# Patient Record
Sex: MALE | Race: WHITE | Employment: FULL TIME | URBAN - METROPOLITAN AREA
[De-identification: names, ages, dates, MRNs, and addresses within clinical notes are randomized per-mention and may not be internally consistent; named-entity substitution may affect disease eponyms.]

---

## 2022-09-26 ENCOUNTER — TELEPHONE (OUTPATIENT)
Dept: ORTHOPEDIC SURGERY | Age: 52
End: 2022-09-26

## 2022-10-03 ENCOUNTER — OFFICE VISIT (OUTPATIENT)
Dept: ORTHOPEDIC SURGERY | Age: 52
End: 2022-10-03
Payer: COMMERCIAL

## 2022-10-03 VITALS — HEIGHT: 73 IN | BODY MASS INDEX: 30.93 KG/M2 | WEIGHT: 233.4 LBS

## 2022-10-03 DIAGNOSIS — M54.16 LUMBAR RADICULOPATHY: ICD-10-CM

## 2022-10-03 DIAGNOSIS — M54.50 LOW BACK PAIN, UNSPECIFIED BACK PAIN LATERALITY, UNSPECIFIED CHRONICITY, UNSPECIFIED WHETHER SCIATICA PRESENT: Primary | ICD-10-CM

## 2022-10-03 DIAGNOSIS — M48.061 STENOSIS OF LATERAL RECESS OF LUMBAR SPINE: ICD-10-CM

## 2022-10-03 DIAGNOSIS — M51.36 LUMBAR DEGENERATIVE DISC DISEASE: ICD-10-CM

## 2022-10-03 PROCEDURE — 99204 OFFICE O/P NEW MOD 45 MIN: CPT | Performed by: NURSE PRACTITIONER

## 2022-10-03 RX ORDER — CELECOXIB 200 MG/1
200 CAPSULE ORAL DAILY PRN
Qty: 30 CAPSULE | Refills: 0 | Status: SHIPPED | OUTPATIENT
Start: 2022-10-03

## 2022-10-03 RX ORDER — GABAPENTIN 300 MG/1
300 CAPSULE ORAL NIGHTLY
Qty: 30 CAPSULE | Refills: 0 | Status: SHIPPED | OUTPATIENT
Start: 2022-10-03 | End: 2022-11-02

## 2022-10-03 RX ORDER — LISINOPRIL 5 MG/1
TABLET ORAL
COMMUNITY
Start: 2022-07-27

## 2022-10-03 RX ORDER — OMEGA-3 FATTY ACIDS/FISH OIL 300-1000MG
200 CAPSULE ORAL 2 TIMES DAILY
COMMUNITY
Start: 2014-09-23

## 2022-10-03 RX ORDER — NAPROXEN 500 MG/1
TABLET ORAL
COMMUNITY

## 2022-10-03 RX ORDER — ESOMEPRAZOLE MAGNESIUM 10 MG/1
20 GRANULE, FOR SUSPENSION, EXTENDED RELEASE ORAL DAILY
COMMUNITY

## 2022-10-03 RX ORDER — ROSUVASTATIN CALCIUM 20 MG/1
20 TABLET, COATED ORAL
COMMUNITY
Start: 2022-06-20

## 2022-10-03 RX ORDER — DICLOFENAC SODIUM 20 MG/G
2 SOLUTION TOPICAL 2 TIMES DAILY
COMMUNITY
Start: 2021-04-21

## 2022-10-03 RX ORDER — METHYLPREDNISOLONE 4 MG/1
TABLET ORAL
Qty: 1 KIT | Refills: 0 | Status: SHIPPED | OUTPATIENT
Start: 2022-10-03

## 2022-10-03 NOTE — PROGRESS NOTES
Name: Kymberly Goldsmith. YOB: 1970  Gender: male  MRN: [id number]    CC: Chronic left leg pain    HPI: This is a 47y.o. year old male who resents with chronic left leg pain. He has had acute on chronic pain for some time however recently has been getting worse. Its in the left buttock and can radiate down to the posterior mid thigh. He also has intermittent left bursitis symptoms. Patient had L5-S1 discectomy on the left by Dr. Bashir Hernandez 10 years ago. He states that the pain used to go all the way to his foot but after surgery stopped at his mid thigh. He did have a repeat MRI after surgery which showed very mild disc in 2013 abutting the S1 nerve root on the left. The patient denies any change in bowel or bladder function since the onset of the symptoms. Thus far, the patient has tried NSAIDS, oral steroids, gabapentin or lyrica, and physician directed home exercise program    Current pain level: Activities limited by pain:        AMB PAIN ASSESSMENT 10/3/2022   Location of Pain Back   Location Modifiers Left   Frequency of Pain Intermittent   Limiting Behavior Yes   Result of Injury No   Work-Related Injury No            ROS/Meds/PSH/PMH/FH/SH: I personally reviewed the patient's collected intake data. Below are the pertinents:    No Known Allergies      Current Outpatient Medications:     rosuvastatin (CRESTOR) 20 MG tablet, Take 20 mg by mouth, Disp: , Rfl:     ibuprofen (ADVIL;MOTRIN) 200 MG CAPS, 200 mg 2 times daily, Disp: , Rfl:     Diclofenac Sodium (PENNSAID) 2 % SOLN, Apply 2 Pump topically 2 times daily, Disp: , Rfl:     methylPREDNISolone (MEDROL DOSEPACK) 4 MG tablet, Take by mouth as directed. Start in morning, Disp: 1 kit, Rfl: 0    gabapentin (NEURONTIN) 300 MG capsule, Take 1 capsule by mouth nightly for 30 days.  Intended supply: 30 days, Disp: 30 capsule, Rfl: 0    celecoxib (CELEBREX) 200 MG capsule, Take 1 capsule by mouth daily as needed for Pain, Disp: 30 capsule, Rfl: 0    lisinopril (PRINIVIL;ZESTRIL) 5 MG tablet, TAKE 1 TABLET BY MOUTH EVERY DAY, Disp: , Rfl:     naproxen (NAPROSYN) 500 MG tablet, naproxen 500 mg tablet  Take 1 tablet twice a day by oral route., Disp: , Rfl:     Esomeprazole Magnesium 10 MG PACK, Take 20 mg by mouth daily, Disp: , Rfl:     History reviewed. No pertinent surgical history. There is no problem list on file for this patient. Tobacco:  reports that he has never smoked. He has never used smokeless tobacco.  Alcohol:   Social History     Substance and Sexual Activity   Alcohol Use Yes          Physical Exam:   BMI: Body mass index is 31.22 kg/m². GENERAL:  Adult in no acute distress, well developed, well nourished Patient is appropriately conversant  MSK:  Examination of the lumbar spine reveals paraspinal tenderness , facet tenderness   There is moderate tenderness to palpation along the spinous processes and paraspinal musculature. The patient ambulates with a normal gait. ROM of bilateral hip(s) reveals no irritability. NEURO:  Cranial nerves grossly intact. No motor deficits. Straight leg testing is positive left  Sensory testing reveals intact sensation to light touch and in the distribution of the L3-S1 dermatomes bilaterally  Ankle jerk is negative for clonus    Reflexes   Right Left   Quadriceps (L4) 2 2   Achilles (S1) 2 2     Strength testing in the lower extremity reveals the following based on the 5 point grading scale:     HF (L2) H Ab (L5) KE (L3/4) ADF (L4) EHL (L5) A Ev (S1) APF (S1)   Right 5 5 5 5 5 5 5   Left 5 5 5 5 5 5 5     PSYCH:  Alert and oriented X 3. Appropriate affect. Intact judgment and insight. Radiographic Studies:     AP, lateral and spot views of the lumbar spine:      Patient has severe disc degeneration at L5-S1. Diffuse spondylotic changes throughout. No fractures or lesions. Lordosis is still maintained.     Interpretation: Degenerative disc disease at L5-S1 that is severe nature. MRI of Lumbar spine images independently reviewed:   Assistant lateral recess stenosis and disc at L5-S1 on the left abutting the left S1 nerve root from an MRI in 2013      Assessment/Plan:       Diagnosis Orders   1. Low back pain, unspecified back pain laterality, unspecified chronicity, unspecified whether sciatica present  XR LUMBAR SPINE (2-3 VIEWS)      2. Lumbar radiculopathy  MRI LUMBAR SPINE WO CONTRAST      3. Lumbar degenerative disc disease  MRI LUMBAR SPINE WO CONTRAST      4. Stenosis of lateral recess of lumbar spine  MRI LUMBAR SPINE WO CONTRAST          This patient's clinical history and physical exam is consistent with a left  L-5 and S-1 lumbar radiculopathy. Discussed with patient now he has advanced disc degeneration which happens after discectomy surgery. I suspect he is got chronic compression of L5 and S1. We will start some medication management and order a new MRI. I will see him back after. We discussed the natural history of lumbar radiculopathy in that many of these patients have near complete resolution of their symptoms within eight to twelve weeks with conservative care. We discussed that conservative treatments typically start with activity modification, and medication followed by physical therapy as symptoms allow. Oral and/or epidural steroids are other options. I also discussed potential surgical options if the symptoms fail to improve or there is a progressive neurologic deficit and conservative management has been exhausted. We discussed that surgery is not typically a reliable treatment for isolated back pain, but is usually very reliable in relieving buttock and leg symptoms.        - A home exercise program was prescribed for stretching and strengthening. A list of exercises was provided. - NSAID: The patient is agreeable to a trial of nonsteroidal anti-inflammatory drugs (NSAIDs).  We discussed risks associated with their use, including GI tract or other bleeding, and also some cardiac risk. Instructions were given to discontinue the NSAID if there is any sign of GI bleed, chest pain, or shortness of breath. Continued use of NSAIDS is recommended to be managed by PCP to monitor kidney and liver function.  - Oral Steroids: A short course of oral steroids was prescribed in an attempt to bring the acute radicular symptoms under control. The patient understands the risks and side effects of oral steroids including immunosuppression, hypertension, mood swings, increased blood sugar, including glaucoma. The steroid taper can be followed by NSAIDs once completed. - Neuropathic pain treatment: For neuropathic pain relief the patient was given a prescription and counseled regarding the possibility of risks and complications from this medication.  - A MRI was ordered to delineate anatomy, confirm the diagnosis and assess the severity. 4 This is a undiagnosed new problem with uncertain prognosis    Orders Placed This Encounter   Medications    methylPREDNISolone (MEDROL DOSEPACK) 4 MG tablet     Sig: Take by mouth as directed. Start in morning     Dispense:  1 kit     Refill:  0    gabapentin (NEURONTIN) 300 MG capsule     Sig: Take 1 capsule by mouth nightly for 30 days. Intended supply: 30 days     Dispense:  30 capsule     Refill:  0    celecoxib (CELEBREX) 200 MG capsule     Sig: Take 1 capsule by mouth daily as needed for Pain     Dispense:  30 capsule     Refill:  0        Orders Placed This Encounter   Procedures    XR LUMBAR SPINE (2-3 VIEWS)    MRI LUMBAR SPINE WO CONTRAST            Return for MRI results. ARNOLD Dotson CNP  10/03/22      Elements of this note were created using speech recognition software. As such, errors of speech recognition may be present.

## 2022-10-24 ENCOUNTER — OFFICE VISIT (OUTPATIENT)
Dept: ORTHOPEDIC SURGERY | Age: 52
End: 2022-10-24
Payer: COMMERCIAL

## 2022-10-24 DIAGNOSIS — M48.061 STENOSIS OF LATERAL RECESS OF LUMBAR SPINE: ICD-10-CM

## 2022-10-24 DIAGNOSIS — M48.061 LUMBAR FORAMINAL STENOSIS: ICD-10-CM

## 2022-10-24 DIAGNOSIS — M54.16 LUMBAR RADICULOPATHY: Primary | ICD-10-CM

## 2022-10-24 PROCEDURE — 99214 OFFICE O/P EST MOD 30 MIN: CPT | Performed by: NURSE PRACTITIONER

## 2022-10-24 NOTE — PROGRESS NOTES
Name: Ninfa Arceo. YOB: 1970  Gender: male  MRN: [id number]    CC: Follow-up left leg pain    HPI: This is a 47y.o. year old male who has had a multiyear history of chronic left leg pain. Patient had a history of an L5-S1 discectomy on the left 10 years ago by Dr. Jorge Maldonado. He states that his pain never went completely away. Its down the posterior leg stopping about mid thigh and also the lateral left leg in the L5 distribution. He had an MRI in 2013 which revealed still some abutment of the S1 nerve root on the left. Patient's treatment thus far has included NSAIDs, oral steroids, gabapentin and a home guided exercise program      Current pain level: 7-8  Activities limited by pain: Exertional activities         AMB PAIN ASSESSMENT 10/3/2022   Location of Pain Back   Location Modifiers Left   Frequency of Pain Intermittent   Limiting Behavior Yes   Result of Injury No   Work-Related Injury No            No Known Allergies    Current Outpatient Medications:     rosuvastatin (CRESTOR) 20 MG tablet, Take 20 mg by mouth, Disp: , Rfl:     lisinopril (PRINIVIL;ZESTRIL) 5 MG tablet, TAKE 1 TABLET BY MOUTH EVERY DAY, Disp: , Rfl:     naproxen (NAPROSYN) 500 MG tablet, naproxen 500 mg tablet  Take 1 tablet twice a day by oral route., Disp: , Rfl:     ibuprofen (ADVIL;MOTRIN) 200 MG CAPS, 200 mg 2 times daily, Disp: , Rfl:     Esomeprazole Magnesium 10 MG PACK, Take 20 mg by mouth daily, Disp: , Rfl:     Diclofenac Sodium (PENNSAID) 2 % SOLN, Apply 2 Pump topically 2 times daily, Disp: , Rfl:     methylPREDNISolone (MEDROL DOSEPACK) 4 MG tablet, Take by mouth as directed. Start in morning, Disp: 1 kit, Rfl: 0    gabapentin (NEURONTIN) 300 MG capsule, Take 1 capsule by mouth nightly for 30 days. Intended supply: 30 days, Disp: 30 capsule, Rfl: 0    celecoxib (CELEBREX) 200 MG capsule, Take 1 capsule by mouth daily as needed for Pain, Disp: 30 capsule, Rfl: 0  History reviewed.  No pertinent past medical history. Tobacco:  reports that he has never smoked. He has never used smokeless tobacco.  Alcohol:   Social History     Substance and Sexual Activity   Alcohol Use Yes          Radiographic Studies:       MRI Results (most recent):  STUDY: MRI LUMBAR SPINE WO CONTRAST [id number]        STUDY DATE: 10/17/2022 8:19 AM        HISTORY: Radiculopathy, lumbar region; Other intervertebral disc degeneration,   lumbar region; Spinal stenosis, lumbar region without neurogenic claudication        COMPARISON:  Radiographs performed October 3, 2022. MRI performed October 16, 2013. TECHNIQUE: Multisequence, multiplanar MR images were obtained of the lumbar   spine without the administration of intravenous contrast.           CONTRAST: None. FINDINGS:   SPINAL CORD: Normal morphology. The conus medullaris terminates at the T12-L1   level. NUMBERING: Last fully formed disc space is designatedL5/S1. BONES: Vertebral body stature maintained. No findings of acute fracture. T1/T2   hypointense rounded focus within the T12 vertebral body with subtle STIR   hyperintensity, unchanged when compared to 2013 exam suggesting benignity,   possibly atypical hemangioma. Degenerative endplate marrow signal changes with   subtle edema noted at L5-S1. Degenerative endplate marrow signal changes are   also noted at T11-T12. No diffuse marrow signal abnormality. Moderate to advanced disc height loss at L5-S1, progressive compared to 2013   exam. Disc heights are otherwise preserved. ALIGNMENT: No static listhesis. SOFT TISSUES: The aorta is normal in course and caliber. Partially imaged T2   hyperintense focus at the upper pole of the left kidney, statistically likely   representing a simple cyst.   DEGENERATIVE:   T12-L1: No central canal or foraminal narrowing. L1-L2: No central canal or foraminal narrowing. L2-L3: Mild bilateral facet arthropathy. Shallow circumferential disc bulge.  No   spinal canal or neural foraminal narrowing. L3-L4: Mild bilateral facet arthropathy and ligament hypertrophy. Shallow   circumferential disc bulge. No spinal canal or neural foraminal narrowing. L4-L5: Bilateral facet arthropathy and ligamentum flavum hypertrophy. Circumferential disc bulge. Mild bilateral lateral recess narrowing with mild   spinal canal narrowing. Mild bilateral neural foraminal narrowing. Findings have   slightly progressed when compared to 2013 exam.   L5-S1: Mild bilateral facet arthropathy. Circumferential disc bulge. No spinal   canal narrowing. Mild bilateral lateral recess narrowing. Moderate left and mild   to moderate right neural foraminal narrowing. There is also slight lateral recess narrowing on the left. I can see where it is abutting the S1 nerve root. Impression       Multilevel lumbar spondylosis, as detailed above. At L4-L5, there is mild spinal   canal and bilateral lateral recess narrowing as well as mild bilateral neural   foraminal narrowing. At L5-S1, there is mild bilateral lateral recess narrowing,   moderate left neural foraminal narrowing and mild to moderate right neural   foraminal narrowing. Assessment/Plan:        ICD-10-CM    1. Lumbar radiculopathy  M54.16       2. Lumbar foraminal stenosis  M48.061       3. Stenosis of lateral recess of lumbar spine  M48.061         Reviewed patient's MRI images with him and the findings. We discussed the pathophysiology. Left L5 and S1 foraminal and lateral recess stenosis causing chronic radiculopathy complaints in the L5 and S1 distribution. Discussed a revision left L5-S1 hemilaminectomy versus interventional management. He would like to trial injections. Therefore patient will be referred for a left L5 and S1 selective nerve root block. - Injection: The patient will be referred for a lumbar steroid injection to help reduce the symptoms.  The patient understands the risks including hyperglycemia, immunosuppression, meningitis, cerebrospinal fluid leak, epidural hematoma, and reaction to medication. The patient may benefit from additional injections depending on the response. The injection should be a left L5 and S1 selective nerve root block      No orders of the defined types were placed in this encounter. No orders of the defined types were placed in this encounter. 4 This is a chronic illness/condition with exacerbation and progression      Return for Injection follow up. ARNOLD Encinas CNP  10/24/22      Elements of this note were created using speech recognition software. As such, errors of speech recognition may be present.

## 2022-10-31 ENCOUNTER — TELEPHONE (OUTPATIENT)
Dept: ORTHOPEDIC SURGERY | Age: 52
End: 2022-10-31

## 2022-11-07 ENCOUNTER — OFFICE VISIT (OUTPATIENT)
Dept: ORTHOPEDIC SURGERY | Age: 52
End: 2022-11-07
Payer: COMMERCIAL

## 2022-11-07 DIAGNOSIS — M48.061 LUMBAR FORAMINAL STENOSIS: ICD-10-CM

## 2022-11-07 DIAGNOSIS — M54.50 LOW BACK PAIN, UNSPECIFIED BACK PAIN LATERALITY, UNSPECIFIED CHRONICITY, UNSPECIFIED WHETHER SCIATICA PRESENT: ICD-10-CM

## 2022-11-07 DIAGNOSIS — M51.36 LUMBAR DEGENERATIVE DISC DISEASE: ICD-10-CM

## 2022-11-07 DIAGNOSIS — M54.16 LUMBAR RADICULOPATHY: Primary | ICD-10-CM

## 2022-11-07 DIAGNOSIS — M48.061 STENOSIS OF LATERAL RECESS OF LUMBAR SPINE: ICD-10-CM

## 2022-11-07 PROCEDURE — 64483 NJX AA&/STRD TFRM EPI L/S 1: CPT | Performed by: PHYSICAL MEDICINE & REHABILITATION

## 2022-11-07 PROCEDURE — 64484 NJX AA&/STRD TFRM EPI L/S EA: CPT | Performed by: PHYSICAL MEDICINE & REHABILITATION

## 2022-11-07 RX ORDER — METHYLPREDNISOLONE ACETATE 80 MG/ML
80 INJECTION, SUSPENSION INTRA-ARTICULAR; INTRALESIONAL; INTRAMUSCULAR; SOFT TISSUE ONCE
Status: COMPLETED | OUTPATIENT
Start: 2022-11-07 | End: 2022-11-07

## 2022-11-07 RX ADMIN — METHYLPREDNISOLONE ACETATE 80 MG: 80 INJECTION, SUSPENSION INTRA-ARTICULAR; INTRALESIONAL; INTRAMUSCULAR; SOFT TISSUE at 13:36

## 2022-11-07 NOTE — PROGRESS NOTES
Name: Hermelindo Ziegler. YOB: 1970  Gender: male  MRN: [id number]        Transforaminal ALBERTO Procedure Note    Procedure: Left  L5-S1 and S1-S2 transforaminal epidural steroid injections     Precautions: Hermelindo Ladd denies prior sensitivity to steroid, local anesthetic, iodine, or shellfish. Consent:  Consent was obtained prior to the procedure. The procedure was discussed at length with Hermelindo Ladd Lynette Kumar He was given the opportunity to ask questions regarding the procedure and its associated risks. In addition to the potential risks associated with the procedure itself, the patient was informed both verbally and in writing of potential side effects of the use glucocorticoids. The patient appeared to comprehend the informed consent and desired to have the procedure performed, and informed consent was signed. He was placed in a prone position on the fluoroscopy table and the skin was prepped and draped in a routine sterile fashion. The areas to be injected were each anesthetized with 1 ml of 1% Lidocaine. A 22 gauge 3.5 inch spinal needle was carefully advanced under fluoroscopic guidance to the left L5-S1 transforaminal space  0.5 ml of 70% of Omnipaque was injected to confirm proper needle placement and absence of subdural or vascular flow Once proper placement was confirmed, 0.5ml of 0.25 marcaine and 60 mg of depomedrol were injected through the spinal needle. The above procedure was then repeated at the Left  S1-S2 transforaminal space using 60 mg of depomedrol. Fluoroscopic guidance was used intermittently over a 10-minute period to insure proper needle placement and his safety. A hard copy of the fluoroscopic image has been placed in his chart and is saved on the C-arm hard drive. He was monitored for 30 minutes after the procedure and discharged home in a stable fashion with a routine follow up.     Procedural Diagnosis:     ICD-10-CM    1. Lumbar radiculopathy  M54.16 FL NERVE BLOCK LUMBOSACRAL 1ST     methylPREDNISolone acetate (DEPO-MEDROL) injection 80 mg      2. Lumbar foraminal stenosis  M48.061 FL NERVE BLOCK LUMBOSACRAL 1ST     methylPREDNISolone acetate (DEPO-MEDROL) injection 80 mg      3. Stenosis of lateral recess of lumbar spine  M48.061 FL NERVE BLOCK LUMBOSACRAL 1ST     methylPREDNISolone acetate (DEPO-MEDROL) injection 80 mg      4. Low back pain, unspecified back pain laterality, unspecified chronicity, unspecified whether sciatica present  M54.50 FL NERVE BLOCK LUMBOSACRAL 1ST     methylPREDNISolone acetate (DEPO-MEDROL) injection 80 mg      5.  Lumbar degenerative disc disease  M51.36 FL NERVE BLOCK LUMBOSACRAL 1ST     methylPREDNISolone acetate (DEPO-MEDROL) injection 80 mg         Cathie Carreno MD  11/07/22

## 2022-11-21 ENCOUNTER — OFFICE VISIT (OUTPATIENT)
Dept: ORTHOPEDIC SURGERY | Age: 52
End: 2022-11-21
Payer: COMMERCIAL

## 2022-11-21 DIAGNOSIS — M48.061 STENOSIS OF LATERAL RECESS OF LUMBAR SPINE: ICD-10-CM

## 2022-11-21 DIAGNOSIS — M54.16 LUMBAR RADICULOPATHY: Primary | ICD-10-CM

## 2022-11-21 PROCEDURE — 99214 OFFICE O/P EST MOD 30 MIN: CPT | Performed by: NURSE PRACTITIONER

## 2022-11-21 NOTE — LETTER
Lumbar Spine Injection Precert Authorization Form    Name: Yobany Riddle. : 1970  Age: 47 y.o. Gender: male    Clinical Information    Referring Doctor: Bakari Jimenez  Diagnosis:     ICD-10-CM    1. Lumbar radiculopathy  M54.16       2. Stenosis of lateral recess of lumbar spine  M48.061       . Body Part:    Type of Service    [ ] 07021+- ALBERTO Caudal or Lumbar    [ ] 58988- Facet Lumbar (single Level)    [ ] 28087- Facet 2nd Level    [ ] 76288- Facet 3 or more level    [ ] 17949- Sacroiliac joint     [x ] 47405- SNRB Transforaminal ALBERTO Lumbar or Sacral (single level)    [ ] 70177- SNRB add levels Lumbar or Sacral     [ ] 82958- Sciatic Nerve, single.      [ ] 08188- Radiofrequency L/S single facet     [ ] 27367- Radiofrequency L/s additional facet       Comments      Insurance:    1st Payor  Payor: [employer] / Plan: [address] / Product Type: *No Product type* /      Secondary Payor if applicable  [unfilled]     Radiologic evidence to support diagnosis:     Non-drug therapy:     Pharmacologic Therapy:

## 2022-11-21 NOTE — PROGRESS NOTES
Name: Leo Gurrola. YOB: 1970  Gender: male  MRN: [id number]    CC: Low up left leg pain, left lower back pain    HPI: This is a 47y.o. year old male who returns today after lumbar injections. Patient has multiyear history of chronic left leg pain. He has a history of a left L5-S1 discectomy 10 years ago by Dr. Kassi Solitario. He states unfortunately his hip pain never went away. It was in the posterior leg stopping about the mid thigh and also the left lateral hip in the L5 distribution. He had a recent MRI that revealed moderate L5 foraminal narrowing on the left. There is also left lateral recess stenosis. I can definitely see where this is abutting the S1 nerve root on the left. He also has some central stenosis at L4-5 with a little bit of lateral recess narrowing. Patient underwent a left L5 and S1 selective nerve root block. Initially his pain was increased for 4 days. Then he obtained about 60% relief for approximately 2 weeks. He has had significant improvement in the left lateral hip and L5 distribution but still continues to have left S1 radiculopathy. Conservative treatment has included home exercise program that he does daily. In addition to NSAIDs with steroids, gabapentin.     Percentage of pain relief: 60% x 2 weeks     Activity limitation or improvement: Increased activity, exercise    Current pain level: 7/10       AMB PAIN ASSESSMENT 10/3/2022   Location of Pain Back   Location Modifiers Left   Frequency of Pain Intermittent   Limiting Behavior Yes   Result of Injury No   Work-Related Injury No           No Known Allergies    Current Outpatient Medications:     rosuvastatin (CRESTOR) 20 MG tablet, Take 20 mg by mouth, Disp: , Rfl:     lisinopril (PRINIVIL;ZESTRIL) 5 MG tablet, TAKE 1 TABLET BY MOUTH EVERY DAY, Disp: , Rfl:     naproxen (NAPROSYN) 500 MG tablet, naproxen 500 mg tablet  Take 1 tablet twice a day by oral route., Disp: , Rfl:     ibuprofen (ADVIL;MOTRIN) 200 MG CAPS, 200 mg 2 times daily, Disp: , Rfl:     Esomeprazole Magnesium 10 MG PACK, Take 20 mg by mouth daily, Disp: , Rfl:     Diclofenac Sodium (PENNSAID) 2 % SOLN, Apply 2 Pump topically 2 times daily, Disp: , Rfl:     methylPREDNISolone (MEDROL DOSEPACK) 4 MG tablet, Take by mouth as directed. Start in morning, Disp: 1 kit, Rfl: 0    gabapentin (NEURONTIN) 300 MG capsule, Take 1 capsule by mouth nightly for 30 days. Intended supply: 30 days, Disp: 30 capsule, Rfl: 0    celecoxib (CELEBREX) 200 MG capsule, Take 1 capsule by mouth daily as needed for Pain, Disp: 30 capsule, Rfl: 0  History reviewed. No pertinent past medical history. Tobacco:  reports that he has never smoked. He has never used smokeless tobacco.  Alcohol:   Social History     Substance and Sexual Activity   Alcohol Use Yes          Radiographic Studies:       Assessment/Plan:        ICD-10-CM    1. Lumbar radiculopathy  M54.16       2. Stenosis of lateral recess of lumbar spine  M48.061            Patient has had improvement in the L5 distribution of his pain but continues to have S1 radiculopathy. This is worse with any increased activity. What I would recommend is trialing another injection. He would like to do this as well. We also discussed surgical intervention but he is really trying to hold off on this. Therefore patient will be referred for a left S1 selective nerve root block. We will see him back after the injection. - Injection: The patient will be referred for a lumbar steroid injection to help reduce the symptoms. The patient understands the risks including hyperglycemia, immunosuppression, meningitis, cerebrospinal fluid leak, epidural hematoma, and reaction to medication. The patient may benefit from additional injections depending on the response. The injection should be a left S1 selective nerve root block. No orders of the defined types were placed in this encounter.        No orders of the defined types were placed in this encounter. 4 This is a chronic illness/condition with exacerbation and progression      Return for Injection follow up. ARNOLD Balbuena - CNP  11/21/22      Elements of this note were created using speech recognition software. As such, errors of speech recognition may be present.

## 2022-12-05 ENCOUNTER — OFFICE VISIT (OUTPATIENT)
Dept: ORTHOPEDIC SURGERY | Age: 52
End: 2022-12-05

## 2022-12-05 DIAGNOSIS — M51.36 LUMBAR DEGENERATIVE DISC DISEASE: ICD-10-CM

## 2022-12-05 DIAGNOSIS — M48.061 STENOSIS OF LATERAL RECESS OF LUMBAR SPINE: ICD-10-CM

## 2022-12-05 DIAGNOSIS — M54.50 LOW BACK PAIN, UNSPECIFIED BACK PAIN LATERALITY, UNSPECIFIED CHRONICITY, UNSPECIFIED WHETHER SCIATICA PRESENT: ICD-10-CM

## 2022-12-05 DIAGNOSIS — M54.16 LUMBAR RADICULOPATHY: Primary | ICD-10-CM

## 2022-12-05 DIAGNOSIS — M48.061 LUMBAR FORAMINAL STENOSIS: ICD-10-CM

## 2022-12-05 RX ORDER — METHYLPREDNISOLONE ACETATE 80 MG/ML
80 INJECTION, SUSPENSION INTRA-ARTICULAR; INTRALESIONAL; INTRAMUSCULAR; SOFT TISSUE ONCE
Status: COMPLETED | OUTPATIENT
Start: 2022-12-05 | End: 2022-12-05

## 2022-12-05 RX ADMIN — METHYLPREDNISOLONE ACETATE 80 MG: 80 INJECTION, SUSPENSION INTRA-ARTICULAR; INTRALESIONAL; INTRAMUSCULAR; SOFT TISSUE at 12:07

## 2022-12-05 NOTE — PROGRESS NOTES
Name: Nikia Manley. YOB: 1970  Gender: male  MRN: [id number]        Transforaminal [facility] Procedure Note    Procedure: Left  S1-S2 transforaminal epidural steroid injections     Precautions: Nikia Barksdale denies prior sensitivity to steroid, local anesthetic, iodine, or shellfish. Consent:  Consent was obtained prior to the procedure. The procedure was discussed at length with Nikia Manley. Adonis Ghotra He was given the opportunity to ask questions regarding the procedure and its associated risks. In addition to the potential risks associated with the procedure itself, the patient was informed both verbally and in writing of potential side effects of the use glucocorticoids. The patient appeared to comprehend the informed consent and desired to have the procedure performed, and informed consent was signed. He was placed in a prone position on the fluoroscopy table and the skin was prepped and draped in a routine sterile fashion. The areas to be injected were each anesthetized with 1 ml of 1% Lidocaine. A 22 gauge 3.5 inch spinal needle was carefully advanced under fluoroscopic guidance to the left S1-S2 transforaminal space  0.5 ml of 70% of Omnipaque was injected to confirm proper needle placement and absence of subdural or vascular flow Once proper placement was confirmed, 0.5ml of 0.25 marcaine and 80 mg of depomedrol were injected through the spinal needle. Fluoroscopic guidance was used intermittently over a 10-minute period to insure proper needle placement and his safety. A hard copy of the fluoroscopic image has been placed in his chart and is saved on the C-arm hard drive. He was monitored for 30 minutes after the procedure and discharged home in a stable fashion with a routine follow up. Procedural Diagnosis:     ICD-10-CM    1. Lumbar radiculopathy  M54.16 FL NERVE BLOCK LUMBOSACRAL 1ST     methylPREDNISolone acetate (DEPO-MEDROL) injection 80 mg      2. Stenosis of lateral recess of lumbar spine  M48.061 FL NERVE BLOCK LUMBOSACRAL 1ST     methylPREDNISolone acetate (DEPO-MEDROL) injection 80 mg      3. Lumbar foraminal stenosis  M48.061 FL NERVE BLOCK LUMBOSACRAL 1ST     methylPREDNISolone acetate (DEPO-MEDROL) injection 80 mg      4. Low back pain, unspecified back pain laterality, unspecified chronicity, unspecified whether sciatica present  M54.50 FL NERVE BLOCK LUMBOSACRAL 1ST     methylPREDNISolone acetate (DEPO-MEDROL) injection 80 mg      5.  Lumbar degenerative disc disease  M51.36 FL NERVE BLOCK LUMBOSACRAL 1ST     methylPREDNISolone acetate (DEPO-MEDROL) injection 80 mg         Claudia Omalley MD  12/05/22

## 2022-12-12 RX ORDER — CELECOXIB 200 MG/1
200 CAPSULE ORAL DAILY PRN
Qty: 30 CAPSULE | Refills: 0 | OUTPATIENT
Start: 2022-12-12

## 2022-12-12 RX ORDER — GABAPENTIN 300 MG/1
300 CAPSULE ORAL NIGHTLY
Qty: 30 CAPSULE | Refills: 0 | OUTPATIENT
Start: 2022-12-12 | End: 2023-01-11

## 2022-12-22 ENCOUNTER — PATIENT MESSAGE (OUTPATIENT)
Dept: ORTHOPEDIC SURGERY | Age: 52
End: 2022-12-22

## 2022-12-23 RX ORDER — CELECOXIB 200 MG/1
200 CAPSULE ORAL 2 TIMES DAILY
Qty: 60 CAPSULE | Refills: 0 | Status: SHIPPED | OUTPATIENT
Start: 2022-12-23

## 2022-12-23 NOTE — TELEPHONE ENCOUNTER
Lesley Belle, [place] 12/22/2022 2:18 PM EST    Patient states he only got 2-3 day relief from injection. Celebrex may have helped some, doesn't remember any relief from gabapentin was only taking I 1 qhs. Steriods did help. Please advise on what you would like to do . If nothing I told him it would be until Tuesday that I get a response.   ----- Message -----  From: Ryan Styles. Sent: 12/22/2022 1:50 PM EST  To: , *  Subject: Back Pain     My back pain has gotten alot worse in the last few days especially when I am sitting. Can I get any medicine that may help? I was on Gabapentin and Celebrex but I ran outand do not have any refills. Thanks.     Anibal Rowe

## 2023-01-09 ENCOUNTER — OFFICE VISIT (OUTPATIENT)
Dept: ORTHOPEDIC SURGERY | Age: 53
End: 2023-01-09
Payer: COMMERCIAL

## 2023-01-09 DIAGNOSIS — M48.061 STENOSIS OF LATERAL RECESS OF LUMBAR SPINE: Primary | ICD-10-CM

## 2023-01-09 DIAGNOSIS — M54.16 LUMBAR RADICULOPATHY: ICD-10-CM

## 2023-01-09 PROCEDURE — 99214 OFFICE O/P EST MOD 30 MIN: CPT | Performed by: NURSE PRACTITIONER

## 2023-01-09 RX ORDER — PREDNISONE 5 MG/1
5 TABLET ORAL SEE ADMIN INSTRUCTIONS
Qty: 48 EACH | Refills: 0 | Status: SHIPPED | OUTPATIENT
Start: 2023-01-09 | End: 2023-01-19

## 2023-01-09 NOTE — PROGRESS NOTES
Name: Sheryl Andrea. YOB: 1970  Gender: male  MRN: [id number]    CC: Follow-up left leg pain, left lower back pain    HPI: This is a 47y.o. year old male who returns today after lumbar injections. She recently underwent a repeat left S1 selective nerve root block. He states that this 1 actually made his pain worse. Has had a history of left L5-S1 discectomy 10 years ago by Dr. Melissa Foote. Unfortunately he never got significant pain relief but his pain has gotten much worse. MRI revealed moderate L5 foraminal narrowing with left lateral recess stenosis but definitely at L5-S1's abutment of the S1 nerve due to residual disc. He has also tried home exercise program, NSAIDs, steroids and gabapentin. Patient was tolerating Celebrex and 300 mg gabapentin at night. However he tells me when the gabapentin ran out he had quite an emotional impact on him for about 2 weeks afterwards. He states that the pain is interfering with his daily life. He rates his pain a 7 to an 8 out of 10.        AMB PAIN ASSESSMENT 10/3/2022   Location of Pain Back   Location Modifiers Left   Frequency of Pain Intermittent   Limiting Behavior Yes   Result of Injury No   Work-Related Injury No           No Known Allergies    Current Outpatient Medications:     predniSONE 5 MG (48) TBPK, Take 5 mg by mouth See Admin Instructions for 10 days, Disp: 48 each, Rfl: 0    celecoxib (CELEBREX) 200 MG capsule, Take 1 capsule by mouth 2 times daily, Disp: 60 capsule, Rfl: 0    rosuvastatin (CRESTOR) 20 MG tablet, Take 20 mg by mouth, Disp: , Rfl:     lisinopril (PRINIVIL;ZESTRIL) 5 MG tablet, TAKE 1 TABLET BY MOUTH EVERY DAY, Disp: , Rfl:     naproxen (NAPROSYN) 500 MG tablet, naproxen 500 mg tablet  Take 1 tablet twice a day by oral route., Disp: , Rfl:     ibuprofen (ADVIL;MOTRIN) 200 MG CAPS, 200 mg 2 times daily, Disp: , Rfl:     Esomeprazole Magnesium 10 MG PACK, Take 20 mg by mouth daily, Disp: , Rfl:     Diclofenac Sodium (PENNSAID) 2 % SOLN, Apply 2 Pump topically 2 times daily, Disp: , Rfl:     methylPREDNISolone (MEDROL DOSEPACK) 4 MG tablet, Take by mouth as directed. Start in morning, Disp: 1 kit, Rfl: 0    gabapentin (NEURONTIN) 300 MG capsule, Take 1 capsule by mouth nightly for 30 days. Intended supply: 30 days, Disp: 30 capsule, Rfl: 0    celecoxib (CELEBREX) 200 MG capsule, Take 1 capsule by mouth daily as needed for Pain, Disp: 30 capsule, Rfl: 0  History reviewed. No pertinent past medical history. Tobacco:  reports that he has never smoked. He has never used smokeless tobacco.  Alcohol:   Social History     Substance and Sexual Activity   Alcohol Use Yes          Radiographic Studies:       Assessment/Plan:        ICD-10-CM    1. Stenosis of lateral recess of lumbar spine  M48.061       2. Lumbar radiculopathy  M54.16            I reviewed patient's MRI with Dr. Jose Carlos Jerez. He would recommend a revision L5-S1 laminotomy and discectomy. He states that he has about a 60% chance of getting better. I did review this with patient. He would like to discuss this with his wife. Currently given his side effect of coming off of the gabapentin I would stay away from neuromodulator medications they obviously impact him. We did discuss possibly amitriptyline. I will give him a Sterapred pack to try to help with the increased pain he is having after the injection. He still taking Celebrex.    - Oral Steroids: A short course of oral steroids was prescribed in an attempt to bring the acute radicular symptoms under control. The patient understands the risks and side effects of oral steroids including immunosuppression, hypertension, mood swings, increased blood sugar, including glaucoma. The steroid taper can be followed by NSAIDs once completed. -Future surgery: If the patient fails the conservative options listed above, I believe they may be a candidate for a left L5-S1 revision laminotomy and discectomy.     Orders Placed This Encounter   Medications    predniSONE 5 MG (48) TBPK     Sig: Take 5 mg by mouth See Admin Instructions for 10 days     Dispense:  48 each     Refill:  0        No orders of the defined types were placed in this encounter. 4 This is a chronic illness/condition with exacerbation and progression      Return for Surgery- will call . ARNOLD Villa - CNP  01/09/23      Elements of this note were created using speech recognition software. As such, errors of speech recognition may be present.

## 2023-01-19 RX ORDER — CELECOXIB 200 MG/1
CAPSULE ORAL
Qty: 60 CAPSULE | Refills: 0 | OUTPATIENT
Start: 2023-01-19